# Patient Record
Sex: FEMALE | Race: BLACK OR AFRICAN AMERICAN | NOT HISPANIC OR LATINO | URBAN - NONMETROPOLITAN AREA
[De-identification: names, ages, dates, MRNs, and addresses within clinical notes are randomized per-mention and may not be internally consistent; named-entity substitution may affect disease eponyms.]

---

## 2020-05-16 ENCOUNTER — IMPORTED ENCOUNTER (OUTPATIENT)
Dept: URBAN - NONMETROPOLITAN AREA CLINIC 1 | Facility: CLINIC | Age: 55
End: 2020-05-16

## 2020-05-16 PROBLEM — H25.13: Noted: 2020-05-16

## 2020-05-16 PROBLEM — H52.4: Noted: 2020-05-16

## 2020-05-16 PROCEDURE — S0620 ROUTINE OPHTHALMOLOGICAL EXA: HCPCS

## 2020-05-16 NOTE — PATIENT DISCUSSION
Hyperopia / Astigmatism / Presbyopia OU- Discussed diagnosis in detail with patient- New Glasses and CL RX given today- Continue to monitor- RTC 1 year complete [place] OU- Discussed diagnosis in detail with patient- Discussed signs and symptoms of progression- Discussed UV protection- Continue to monitorMyokymia- Discussed diagnosis in detail with patient - Recommend cool compresses through out the day - Recommend [place] BID OU - Continue to monitor Mother has Glaucoma and patient has been tested previously for Glaucoma and has been on drops but was taken off drops by previous doctor.  Any changes will consider testing

## 2022-04-09 ASSESSMENT — TONOMETRY
OD_IOP_MMHG: 16
OS_IOP_MMHG: 16

## 2022-04-09 ASSESSMENT — VISUAL ACUITY
OD_CC: 20/40-2
OS_CC: 20/40+2